# Patient Record
Sex: MALE | URBAN - METROPOLITAN AREA
[De-identification: names, ages, dates, MRNs, and addresses within clinical notes are randomized per-mention and may not be internally consistent; named-entity substitution may affect disease eponyms.]

---

## 2021-07-19 ENCOUNTER — APPOINTMENT (RX ONLY)
Dept: URBAN - METROPOLITAN AREA CLINIC 26 | Facility: CLINIC | Age: 86
Setting detail: DERMATOLOGY
End: 2021-07-19

## 2021-07-19 PROBLEM — C44.329 SQUAMOUS CELL CARCINOMA OF SKIN OF OTHER PARTS OF FACE: Status: ACTIVE | Noted: 2021-07-19

## 2021-07-19 PROBLEM — C44.42 SQUAMOUS CELL CARCINOMA OF SKIN OF SCALP AND NECK: Status: ACTIVE | Noted: 2021-07-19

## 2021-07-19 PROCEDURE — ? ADDITIONAL NOTES

## 2021-07-19 PROCEDURE — 13132 CMPLX RPR F/C/C/M/N/AX/G/H/F: CPT

## 2021-07-19 PROCEDURE — ? CONSULTATION FOR MOHS SURGERY

## 2021-07-19 PROCEDURE — ? MOHS SURGERY

## 2021-07-19 PROCEDURE — 17311 MOHS 1 STAGE H/N/HF/G: CPT

## 2021-07-19 PROCEDURE — 99203 OFFICE O/P NEW LOW 30 MIN: CPT | Mod: 25

## 2021-07-19 PROCEDURE — 17312 MOHS ADDL STAGE: CPT

## 2021-07-19 NOTE — HPI: MOHS SURGERY CONSULTATION
Has The Cancer Been Biopsied Before?: has been previously biopsied
When Was Your Biopsy?: 4/29/2021
Accession (Optional): [id number]
Who Is Your Referring Provider?: Nick Smith
Accession (Optional): [id number]
Who Is Your Referring Provider?: Nick Smith MD

## 2021-07-19 NOTE — PROCEDURE: CONSULTATION FOR MOHS SURGERY
Detail Level: Detailed
Size Of Lesion: 0.8
Incorporate Mauc In Note: Yes
X Size Of Lesion In Cm (Optional): 0.7

## 2021-08-09 ENCOUNTER — APPOINTMENT (RX ONLY)
Dept: URBAN - METROPOLITAN AREA CLINIC 26 | Facility: CLINIC | Age: 86
Setting detail: DERMATOLOGY
End: 2021-08-09

## 2021-08-09 PROBLEM — C44.329 SQUAMOUS CELL CARCINOMA OF SKIN OF OTHER PARTS OF FACE: Status: ACTIVE | Noted: 2021-08-09

## 2021-08-09 PROCEDURE — ? MOHS SURGERY

## 2021-08-09 PROCEDURE — 13132 CMPLX RPR F/C/C/M/N/AX/G/H/F: CPT

## 2021-08-09 PROCEDURE — 17311 MOHS 1 STAGE H/N/HF/G: CPT

## 2021-08-09 NOTE — PROCEDURE: MOHS SURGERY
yes Retention Suture Text: Retention sutures were placed to support the closure and prevent dehiscence.

## 2024-09-21 NOTE — PROCEDURE: MOHS SURGERY
MED Staged Advancement Flap Text: The defect edges were debeveled with a #15 scalpel blade.  Given the location of the defect, shape of the defect and the proximity to free margins a staged advancement flap was deemed most appropriate.  Using a sterile surgical marker, an appropriate advancement flap was drawn incorporating the defect and placing the expected incisions within the relaxed skin tension lines where possible. The area thus outlined was incised deep to adipose tissue with a #15 scalpel blade.  The skin margins were undermined to an appropriate distance in all directions utilizing iris scissors.
